# Patient Record
Sex: FEMALE | Race: WHITE | NOT HISPANIC OR LATINO
[De-identification: names, ages, dates, MRNs, and addresses within clinical notes are randomized per-mention and may not be internally consistent; named-entity substitution may affect disease eponyms.]

---

## 2018-01-28 ENCOUNTER — TRANSCRIPTION ENCOUNTER (OUTPATIENT)
Age: 73
End: 2018-01-28

## 2021-05-09 ENCOUNTER — EMERGENCY (EMERGENCY)
Facility: HOSPITAL | Age: 76
LOS: 1 days | Discharge: HOME | End: 2021-05-09
Attending: EMERGENCY MEDICINE | Admitting: EMERGENCY MEDICINE
Payer: MEDICARE

## 2021-05-09 VITALS
OXYGEN SATURATION: 96 % | WEIGHT: 171.08 LBS | TEMPERATURE: 97 F | RESPIRATION RATE: 18 BRPM | DIASTOLIC BLOOD PRESSURE: 100 MMHG | HEIGHT: 65 IN | SYSTOLIC BLOOD PRESSURE: 166 MMHG | HEART RATE: 81 BPM

## 2021-05-09 VITALS
HEART RATE: 72 BPM | SYSTOLIC BLOOD PRESSURE: 149 MMHG | TEMPERATURE: 98 F | DIASTOLIC BLOOD PRESSURE: 84 MMHG | OXYGEN SATURATION: 98 % | RESPIRATION RATE: 16 BRPM

## 2021-05-09 PROCEDURE — 99284 EMERGENCY DEPT VISIT MOD MDM: CPT | Mod: 25

## 2021-05-09 PROCEDURE — 70450 CT HEAD/BRAIN W/O DYE: CPT | Mod: 26,ME

## 2021-05-09 PROCEDURE — 70480 CT ORBIT/EAR/FOSSA W/O DYE: CPT

## 2021-05-09 PROCEDURE — 99284 EMERGENCY DEPT VISIT MOD MDM: CPT

## 2021-05-09 PROCEDURE — G1004: CPT

## 2021-05-09 PROCEDURE — 70450 CT HEAD/BRAIN W/O DYE: CPT

## 2021-05-09 PROCEDURE — 70480 CT ORBIT/EAR/FOSSA W/O DYE: CPT | Mod: 26,MG,59

## 2021-05-09 RX ORDER — ASPIRIN/CALCIUM CARB/MAGNESIUM 324 MG
1 TABLET ORAL
Qty: 0 | Refills: 0 | DISCHARGE

## 2021-05-09 RX ORDER — METFORMIN HYDROCHLORIDE 850 MG/1
1 TABLET ORAL
Qty: 0 | Refills: 0 | DISCHARGE

## 2021-05-09 RX ORDER — NIFEDIPINE 30 MG
1 TABLET, EXTENDED RELEASE 24 HR ORAL
Qty: 0 | Refills: 0 | DISCHARGE

## 2021-05-09 RX ORDER — TRANDOLAPRIL 2 MG
1 TABLET ORAL
Qty: 0 | Refills: 0 | DISCHARGE

## 2021-05-09 RX ORDER — ATENOLOL 25 MG/1
1 TABLET ORAL
Qty: 0 | Refills: 0 | DISCHARGE

## 2021-05-09 RX ORDER — PANTOPRAZOLE SODIUM 20 MG/1
1 TABLET, DELAYED RELEASE ORAL
Qty: 0 | Refills: 0 | DISCHARGE

## 2021-05-09 RX ORDER — ROSUVASTATIN CALCIUM 5 MG/1
1 TABLET ORAL
Qty: 0 | Refills: 0 | DISCHARGE

## 2021-05-09 RX ORDER — GUSELKUMAB 100 MG/ML
0 INJECTION SUBCUTANEOUS
Qty: 0 | Refills: 0 | DISCHARGE

## 2021-05-09 RX ORDER — LEVOTHYROXINE SODIUM 125 MCG
1 TABLET ORAL
Qty: 0 | Refills: 0 | DISCHARGE

## 2021-05-09 RX ORDER — DICLOFENAC SODIUM 75 MG/1
1 TABLET, DELAYED RELEASE ORAL
Qty: 0 | Refills: 0 | DISCHARGE

## 2021-05-09 NOTE — ED PROVIDER NOTE - PMH
Aortic insufficiency    Diabetes    GERD (gastroesophageal reflux disease)    HTN (hypertension)

## 2021-05-09 NOTE — ED PROVIDER NOTE - OBJECTIVE STATEMENT
74yo female who presents with head injury s/p fall. pt states she was in a restaurant coming down the stairs and lost her balance, fell and landed on a metal umbrella stand, no LOC< +head trauma, pt c/o pain and swelling over left eye, no blurry or double vision, no neck pain, no other complaints

## 2021-05-09 NOTE — ED ADULT NURSE REASSESSMENT NOTE - NS ED NURSE REASSESS COMMENT FT1
pt is A&Ox4,presented to the ER post fall, bruise noted on the left eye, headache+, denies any blurry vision, ambulates w/o any assistance, diagnostic tests performed, pt updated oon the plan of care, resp even and unlabored, nad noted, will continue to monitor

## 2021-05-09 NOTE — ED ADULT TRIAGE NOTE - CHIEF COMPLAINT QUOTE
Pt tripped and fell hit head/left eye on umbrella rack, left eye swollen shut, c/o headache, dizziness, on baby ASA daily

## 2021-05-09 NOTE — ED PROVIDER NOTE - CARE PLAN
Principal Discharge DX:	Facial contusion, initial encounter  Secondary Diagnosis:	Head injury due to trauma

## 2021-05-09 NOTE — ED PROVIDER NOTE - PATIENT PORTAL LINK FT
You can access the FollowMyHealth Patient Portal offered by [facility] by registering at the following website: [email or web address]. By joining [employer]’s FollowMyHealth portal, you will also be able to view your health information using other applications (apps) compatible with our system.

## 2021-05-09 NOTE — ED PROVIDER NOTE - NSFOLLOWUPINSTRUCTIONS_ED_ALL_ED_FT
Facial Contusion    WHAT YOU NEED TO KNOW:    A facial contusion is a bruise that appears on your face after an injury. A bruise happens when small blood vessels tear but skin does not. When blood vessels tear, blood leaks into nearby tissue, such as soft tissue or muscle. You may develop swelling and bruising around your eyes if your bruise is on your brow, forehead, or the bridge of your nose.     DISCHARGE INSTRUCTIONS:    Return to the emergency department if:   •You have a fever.      •You have watery, clear fluid draining from your nose.       •You have changes in your vision or eye appearance.      •You have changes or pain with eye movement.      •You have tingling or numbness in or near the injured area.      Contact your healthcare provider if:   •You find a new lump in the injured area.      •Your symptoms do not improve with treatment.      •You have questions or concerns about your condition or care.      Medicines:   •Acetaminophen decreases pain. It is available without a doctor's order. Ask how much to take and how often to take it. Follow directions. Acetaminophen can cause liver damage if not taken correctly.      •Take your medicine as directed. Contact your healthcare provider if you think your medicine is not helping or if you have side effects. Tell him of her if you are allergic to any medicine. Keep a list of the medicines, vitamins, and herbs you take. Include the amounts, and when and why you take them. Bring the list or the pill bottles to follow-up visits. Carry your medicine list with you in case of an emergency.      Ice: Apply ice on your bruise for 15 to 20 minutes every hour or as directed. Use an ice pack, or put crushed ice in a plastic bag. Cover it with a towel. Ice helps prevent tissue damage and decreases swelling and pain.    Elevation: Sleep with your head elevated to help decrease swelling.     Help your contusion heal: Do not massage the area or put heating pads or other warming devices on the bruise right after your injury. Heat and massage may slow the healing of the area.    Follow up with your healthcare provider as directed: You may need to return within a week to have your injury checked again. Write down any questions you have so you remember to ask them in your follow-up visits.    Prevent a facial contusion:   •Use safety belts and child restraints.       •Use safety helmets when you ride a bicycle or motorcycle.       •Use a mouth and face guard during sports.

## 2021-09-26 ENCOUNTER — NON-APPOINTMENT (OUTPATIENT)
Age: 76
End: 2021-09-26

## 2021-09-30 ENCOUNTER — NON-APPOINTMENT (OUTPATIENT)
Age: 76
End: 2021-09-30

## 2021-09-30 ENCOUNTER — APPOINTMENT (OUTPATIENT)
Dept: SURGERY | Facility: CLINIC | Age: 76
End: 2021-09-30
Payer: MEDICARE

## 2021-09-30 VITALS
HEART RATE: 62 BPM | WEIGHT: 175 LBS | HEIGHT: 65 IN | BODY MASS INDEX: 29.16 KG/M2 | DIASTOLIC BLOOD PRESSURE: 103 MMHG | SYSTOLIC BLOOD PRESSURE: 173 MMHG

## 2021-09-30 PROCEDURE — 99204 OFFICE O/P NEW MOD 45 MIN: CPT

## 2021-09-30 RX ORDER — TRIAMTERENE 50 MG/1
CAPSULE ORAL
Refills: 0 | Status: ACTIVE | COMMUNITY

## 2021-09-30 RX ORDER — LEVOTHYROXINE SODIUM 0.09 MG/1
88 TABLET ORAL
Refills: 0 | Status: ACTIVE | COMMUNITY

## 2021-09-30 RX ORDER — ATENOLOL 50 MG/1
TABLET ORAL
Refills: 0 | Status: ACTIVE | COMMUNITY

## 2021-10-02 NOTE — REASON FOR VISIT
[Initial Consultation] : an initial consultation for [FreeTextEntry2] : cervical lymphadenopathy [Other: _____] : [unfilled]

## 2021-10-02 NOTE — HISTORY OF PRESENT ILLNESS
[de-identified] : Pt c/o Right neck mass for 6 weeks . denies dental or scalp infections,night sweats,  cough or sore throat, fever, dysphagia, hoarseness, RT exposure or skin cancer excision.  history of thyroid lobectomy for benign disease 20 years ago\par sonogram : Right level 2 Lymph node  1.3 cm,  benign appearing\par WBC 7.4\par I have reviewed all old and new data and available images.\par

## 2021-10-02 NOTE — CONSULT LETTER
[Dear  ___] : Dear  [unfilled], [Consult Letter:] : I had the pleasure of evaluating your patient, [unfilled]. [Please see my note below.] : Please see my note below. [Consult Closing:] : Thank you very much for allowing me to participate in the care of this patient.  If you have any questions, please do not hesitate to contact me. [Sincerely,] : Sincerely, [FreeTextEntry2] : Dr. Bebeto Wiggins, Dr. Yfn Stinson, Dr. Francesca Smith, Dr. Terry Mcmahon, Dr. Anthony Rodriguez, Dr. Germán Marques [FreeTextEntry3] : Jagdish Delaney MD, FACS\par System Director, Endocrine Surgery\par [facility]\par Assistant Clinical Professor of Surgery\par [employer] [facility]  [DrSamia  ___] : Dr. CARTER [DrSamia ___] : Dr. CARTER [___] : [unfilled]

## 2021-10-02 NOTE — ASSESSMENT
[FreeTextEntry1] : possible carotidynia.  CT requested. to call next week for results. patient has been given the opportunity to ask questions, and all of the patient's questions have been answered to their satisfaction\par

## 2021-10-02 NOTE — PHYSICAL EXAM
[de-identified] : well healed low neck scar.  tender right carotid bulb with tortuosity of carotid [de-identified] : no palpable thyroid nodules [Laryngoscopy Performed] : laryngoscopy was performed, see procedure section for findings [Midline] : located in midline position [Normal] : orientation to person, place, and time: normal [de-identified] : indirect  laryngoscopy shows normal vocal cord mobility bilaterally with no lesions noted

## 2021-10-05 ENCOUNTER — NON-APPOINTMENT (OUTPATIENT)
Age: 76
End: 2021-10-05

## 2022-01-13 ENCOUNTER — APPOINTMENT (OUTPATIENT)
Dept: SURGERY | Facility: CLINIC | Age: 77
End: 2022-01-13

## 2022-05-03 ENCOUNTER — APPOINTMENT (OUTPATIENT)
Dept: SURGERY | Facility: CLINIC | Age: 77
End: 2022-05-03
Payer: MEDICARE

## 2022-05-03 DIAGNOSIS — R59.0 LOCALIZED ENLARGED LYMPH NODES: ICD-10-CM

## 2022-05-03 DIAGNOSIS — G90.01 CAROTID SINUS SYNCOPE: ICD-10-CM

## 2022-05-03 PROCEDURE — 99214 OFFICE O/P EST MOD 30 MIN: CPT

## 2022-05-03 NOTE — ASSESSMENT
[FreeTextEntry1] : will observe. to return earlier if any change. . patient has been given the opportunity to ask questions, and all of the patient's questions have been answered to their satisfaction\par

## 2022-05-03 NOTE — PHYSICAL EXAM
[de-identified] : well healed low neck scar.  tender right carotid bulb with tortuosity of carotid [de-identified] : no palpable thyroid nodules [Laryngoscopy Performed] : laryngoscopy was performed, see procedure section for findings [Midline] : located in midline position [Normal] : orientation to person, place, and time: normal

## 2022-05-03 NOTE — HISTORY OF PRESENT ILLNESS
[de-identified] : previous evaluation of neck nodes and right neck discomfort related to carotid. negative CT.  denies any symptoms. no changes medically since last visit. recent TFTs normal. bone density shows osteopenia. I have reviewed all old and new data and available images.\par

## 2022-09-03 ENCOUNTER — EMERGENCY (EMERGENCY)
Facility: HOSPITAL | Age: 77
LOS: 0 days | Discharge: HOME | End: 2022-09-03
Attending: EMERGENCY MEDICINE
Payer: MEDICARE

## 2022-09-03 VITALS
OXYGEN SATURATION: 98 % | WEIGHT: 175.05 LBS | HEART RATE: 61 BPM | DIASTOLIC BLOOD PRESSURE: 66 MMHG | HEIGHT: 65 IN | RESPIRATION RATE: 18 BRPM | TEMPERATURE: 97 F | SYSTOLIC BLOOD PRESSURE: 100 MMHG

## 2022-09-03 VITALS
DIASTOLIC BLOOD PRESSURE: 60 MMHG | HEART RATE: 66 BPM | SYSTOLIC BLOOD PRESSURE: 111 MMHG | RESPIRATION RATE: 17 BRPM | OXYGEN SATURATION: 99 % | TEMPERATURE: 98 F

## 2022-09-03 DIAGNOSIS — E11.9 TYPE 2 DIABETES MELLITUS WITHOUT COMPLICATIONS: ICD-10-CM

## 2022-09-03 DIAGNOSIS — I35.1 NONRHEUMATIC AORTIC (VALVE) INSUFFICIENCY: ICD-10-CM
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
## 2022-09-03 DIAGNOSIS — Z20.822 CONTACT WITH AND (SUSPECTED) EXPOSURE TO COVID-19: ICD-10-CM

## 2022-09-03 DIAGNOSIS — Z79.82 LONG TERM (CURRENT) USE OF ASPIRIN: ICD-10-CM

## 2022-09-03 DIAGNOSIS — T40.425A ADVERSE EFFECT OF TRAMADOL, INITIAL ENCOUNTER: ICD-10-CM

## 2022-09-03 DIAGNOSIS — Z79.84 LONG TERM (CURRENT) USE OF ORAL HYPOGLYCEMIC DRUGS: ICD-10-CM

## 2022-09-03 DIAGNOSIS — R44.3 HALLUCINATIONS, UNSPECIFIED: ICD-10-CM

## 2022-09-03 DIAGNOSIS — R00.1 BRADYCARDIA, UNSPECIFIED: ICD-10-CM

## 2022-09-03 DIAGNOSIS — I10 ESSENTIAL (PRIMARY) HYPERTENSION: ICD-10-CM

## 2022-09-03 DIAGNOSIS — Z96.651 PRESENCE OF RIGHT ARTIFICIAL KNEE JOINT: ICD-10-CM
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
## 2022-09-03 DIAGNOSIS — K21.9 GASTRO-ESOPHAGEAL REFLUX DISEASE WITHOUT ESOPHAGITIS: ICD-10-CM

## 2022-09-03 LAB
ALBUMIN SERPL ELPH-MCNC: 3.1 G/DL — LOW (ref 3.3–5)
ALP SERPL-CCNC: 82 U/L — SIGNIFICANT CHANGE UP (ref 40–120)
ALT FLD-CCNC: 37 U/L — SIGNIFICANT CHANGE UP (ref 12–78)
ANION GAP SERPL CALC-SCNC: 7 MMOL/L — SIGNIFICANT CHANGE UP (ref 5–17)
APTT BLD: 26.6 SEC — LOW (ref 27.5–35.5)
AST SERPL-CCNC: 34 U/L — SIGNIFICANT CHANGE UP (ref 15–37)
BASOPHILS # BLD AUTO: 0.04 K/UL — SIGNIFICANT CHANGE UP (ref 0–0.2)
BASOPHILS NFR BLD AUTO: 0.5 % — SIGNIFICANT CHANGE UP (ref 0–2)
BILIRUB SERPL-MCNC: 0.2 MG/DL — SIGNIFICANT CHANGE UP (ref 0.2–1.2)
BUN SERPL-MCNC: 18 MG/DL — SIGNIFICANT CHANGE UP (ref 7–23)
CALCIUM SERPL-MCNC: 8.8 MG/DL — SIGNIFICANT CHANGE UP (ref 8.5–10.1)
CHLORIDE SERPL-SCNC: 101 MMOL/L — SIGNIFICANT CHANGE UP (ref 96–108)
CO2 SERPL-SCNC: 29 MMOL/L — SIGNIFICANT CHANGE UP (ref 22–31)
CREAT SERPL-MCNC: 0.83 MG/DL — SIGNIFICANT CHANGE UP (ref 0.5–1.3)
D DIMER BLD IA.RAPID-MCNC: <150 NG/ML DDU — SIGNIFICANT CHANGE UP
EGFR: 73 ML/MIN/1.73M2 — SIGNIFICANT CHANGE UP
EOSINOPHIL # BLD AUTO: 0.14 K/UL — SIGNIFICANT CHANGE UP (ref 0–0.5)
EOSINOPHIL NFR BLD AUTO: 1.7 % — SIGNIFICANT CHANGE UP (ref 0–6)
FLUAV AG NPH QL: SIGNIFICANT CHANGE UP
FLUBV AG NPH QL: SIGNIFICANT CHANGE UP
GLUCOSE SERPL-MCNC: 119 MG/DL — HIGH (ref 70–99)
HCT VFR BLD CALC: 26.5 % — LOW (ref 34.5–45)
HGB BLD-MCNC: 9 G/DL — LOW (ref 11.5–15.5)
IMM GRANULOCYTES NFR BLD AUTO: 0.4 % — SIGNIFICANT CHANGE UP (ref 0–1.5)
INR BLD: 1.06 RATIO — SIGNIFICANT CHANGE UP (ref 0.88–1.16)
LYMPHOCYTES # BLD AUTO: 1.11 K/UL — SIGNIFICANT CHANGE UP (ref 1–3.3)
LYMPHOCYTES # BLD AUTO: 13.8 % — SIGNIFICANT CHANGE UP (ref 13–44)
MCHC RBC-ENTMCNC: 29.3 PG — SIGNIFICANT CHANGE UP (ref 27–34)
MCHC RBC-ENTMCNC: 34 GM/DL — SIGNIFICANT CHANGE UP (ref 32–36)
MCV RBC AUTO: 86.3 FL — SIGNIFICANT CHANGE UP (ref 80–100)
MONOCYTES # BLD AUTO: 0.86 K/UL — SIGNIFICANT CHANGE UP (ref 0–0.9)
MONOCYTES NFR BLD AUTO: 10.7 % — SIGNIFICANT CHANGE UP (ref 2–14)
NEUTROPHILS # BLD AUTO: 5.86 K/UL — SIGNIFICANT CHANGE UP (ref 1.8–7.4)
NEUTROPHILS NFR BLD AUTO: 72.9 % — SIGNIFICANT CHANGE UP (ref 43–77)
PLATELET # BLD AUTO: 211 K/UL — SIGNIFICANT CHANGE UP (ref 150–400)
POTASSIUM SERPL-MCNC: 4.3 MMOL/L — SIGNIFICANT CHANGE UP (ref 3.5–5.3)
POTASSIUM SERPL-SCNC: 4.3 MMOL/L — SIGNIFICANT CHANGE UP (ref 3.5–5.3)
PROT SERPL-MCNC: 6.1 GM/DL — SIGNIFICANT CHANGE UP (ref 6–8.3)
PROTHROM AB SERPL-ACNC: 12.3 SEC — SIGNIFICANT CHANGE UP (ref 10.5–13.4)
RBC # BLD: 3.07 M/UL — LOW (ref 3.8–5.2)
RBC # FLD: 13.5 % — SIGNIFICANT CHANGE UP (ref 10.3–14.5)
RSV RNA NPH QL NAA+NON-PROBE: SIGNIFICANT CHANGE UP
SARS-COV-2 RNA SPEC QL NAA+PROBE: SIGNIFICANT CHANGE UP
SODIUM SERPL-SCNC: 137 MMOL/L — SIGNIFICANT CHANGE UP (ref 135–145)
TROPONIN I, HIGH SENSITIVITY RESULT: 4.5 NG/L — SIGNIFICANT CHANGE UP
WBC # BLD: 8.04 K/UL — SIGNIFICANT CHANGE UP (ref 3.8–10.5)
WBC # FLD AUTO: 8.04 K/UL — SIGNIFICANT CHANGE UP (ref 3.8–10.5)

## 2022-09-03 PROCEDURE — 80053 COMPREHEN METABOLIC PANEL: CPT

## 2022-09-03 PROCEDURE — 93010 ELECTROCARDIOGRAM REPORT: CPT

## 2022-09-03 PROCEDURE — 82962 GLUCOSE BLOOD TEST: CPT

## 2022-09-03 PROCEDURE — 85379 FIBRIN DEGRADATION QUANT: CPT

## 2022-09-03 PROCEDURE — 84484 ASSAY OF TROPONIN QUANT: CPT

## 2022-09-03 PROCEDURE — 99285 EMERGENCY DEPT VISIT HI MDM: CPT | Mod: 25

## 2022-09-03 PROCEDURE — 36415 COLL VENOUS BLD VENIPUNCTURE: CPT

## 2022-09-03 PROCEDURE — 85610 PROTHROMBIN TIME: CPT

## 2022-09-03 PROCEDURE — 70450 CT HEAD/BRAIN W/O DYE: CPT | Mod: MA

## 2022-09-03 PROCEDURE — 85025 COMPLETE CBC W/AUTO DIFF WBC: CPT

## 2022-09-03 PROCEDURE — 99285 EMERGENCY DEPT VISIT HI MDM: CPT

## 2022-09-03 PROCEDURE — 0241U: CPT

## 2022-09-03 PROCEDURE — 70450 CT HEAD/BRAIN W/O DYE: CPT | Mod: 26,MA

## 2022-09-03 PROCEDURE — 93005 ELECTROCARDIOGRAM TRACING: CPT

## 2022-09-03 PROCEDURE — 85730 THROMBOPLASTIN TIME PARTIAL: CPT

## 2022-09-03 NOTE — ED ADULT NURSE NOTE - OBJECTIVE STATEMENT
Pt BIBEMS from home, pt was found on the floor by physical therapist. Unknown downtime, unknown LOC, or head strike. On aspirin daily. Just had R knee revision done at [facility] 2 days ago. . Pt has no complaints at this time.

## 2022-09-03 NOTE — ED PROVIDER NOTE - PROGRESS NOTE DETAILS
Pt refused the CXR. MD NICOLE [phone], S surgeon. MD NICOLE D/W [facility] NP Nathalie Bettsarland, agrees with plan. Pt with HgB at [facility] similar, 9.7  She will address further pain management options with the family. Advised to stop tramadol. Discussed all results with  and pt. Agreeable with dc home. Understands reason for UA and CXR (r/o infection), still refuses. Return precautions given.  MD NICOLE

## 2022-09-03 NOTE — ED PROVIDER NOTE - NSFOLLOWUPINSTRUCTIONS_ED_ALL_ED_FT
Stop the tramadol.    Continue other usual medicines.      Follow up with [facility].     Return to ER for any concerns.

## 2022-09-03 NOTE — ED PROVIDER NOTE - NS ED ROS FT
Constitutional: No fever or chills  Eyes: No visual changes  HEENT: No throat pain  CV: No chest pain  Resp: No SOB no cough  GI: No abd pain, nausea or vomiting  : No dysuria  MSK: No musculoskeletal pain  Skin: No rash  Neuro: No headache, +hallucinations

## 2022-09-03 NOTE — ED ADULT NURSE NOTE - NSIMPLEMENTINTERV_GEN_ALL_ED
Implemented All Fall with Harm Risk Interventions:  [place] to call system. Call bell, personal items and telephone within reach. Instruct patient to call for assistance. Room bathroom lighting operational. Non-slip footwear when patient is off stretcher. Physically safe environment: no spills, clutter or unnecessary equipment. Stretcher in lowest position, wheels locked, appropriate side rails in place. Provide visual cue, wrist band, yellow gown, etc. Monitor gait and stability. Monitor for mental status changes and reorient to person, place, and time. Review medications for side effects contributing to fall risk. Reinforce activity limits and safety measures with patient and family. Provide visual clues: red socks.

## 2022-09-03 NOTE — ED PROVIDER NOTE - PHYSICAL EXAMINATION
Constitutional: NAD AOx3  Eyes: PERRL EOMI  Head: Normocephalic atraumatic  Mouth: MMM  Cardiac: regular rate and rhythm  Resp: Lungs CTAB  GI: Abd s/nd/nt  Neuro: CN2-12 grossly intact, SALEH x 4  Skin: No visible rashes, R knee bandage dry clean intact, surrounding yellow green contusion Constitutional: NAD AOx3  Eyes: PERRL EOMI  Head: Normocephalic atraumatic  Mouth: MMM  Cardiac: regular rate and rhythm  Resp: Lungs CTAB  GI: Abd s/nd/nt  Neuro: CN2-12 grossly intact, SALEH x 4  Skin: No visible rashes, R knee bandage CDI, some surrounding yellow green contusion, trave edema. no erythema , no warmth, somewhat limited ROM secondary to pain, can flex to approx 45 degrees

## 2022-09-03 NOTE — ED PROVIDER NOTE - OBJECTIVE STATEMENT
77 y/o female w/ PMHx w/ PMH, aortic insufficiency, GERD, DM, HTN. Pt presents to ED BIBEMS from home, pt was found on the floor by physical therapist. LOC On baby aspirin daily twice a day, was once a day before surgery. Just [facility]. Fell last night at 5:30, was hallucinating yesterday 9/2, has hx of opioids. Takes Tramadol dose this morning 6 AM and noon. No pain from fall. A little disconnected according to . No fever. 77 y/o female w/ PMHx w/ PMH, aortic insufficiency, GERD, DM, HTN. Pt presents to ED BIBEMS from home, pt was found on the floor by physical therapist. No LOC. On baby aspirin twice a day, was once a day before surgery. Just had R knee revision done at [facility] 2 days ago. Fell last night at 5:30, was hallucinating yesterday 9/2, has hx of reaction to opioids so was given tramadol post op for pain. Takes Tramadol dose this morning 6 AM and noon. No pain from fall. A little disconnected according to . No fever.  believes this is a reaction to the tramadol. No HA, No neck pain, ate normally today. Knee feels at baseline. No hip pain. No cp. No SOB.  states she is behaving more normally now. Pt AOx3 during HPI.

## 2022-09-03 NOTE — ED ADULT TRIAGE NOTE - CHIEF COMPLAINT QUOTE
Pt BIBEMS from home, pt was found on the floor by physical therapist. Unknown downtime, unknown LOC, or head strike. On aspirin daily. Just had R knee revision done at [facility] 2 days ago. . Pt has no complaints at this time. MD Huerta made aware. Neuro alert called. Brought directly to CT scan.

## 2022-09-03 NOTE — ED PROVIDER NOTE - PATIENT PORTAL LINK FT
You can access the FollowMyHealth Patient Portal offered by [facility] by registering at the following website: [email or web address]. By joining [employer]’s FollowMyHealth portal, you will also be able to view your health information using other applications (apps) compatible with our system.

## 2022-09-03 NOTE — ED PROVIDER NOTE - NSICDXPASTMEDICALHX_GEN_ALL_CORE_FT
PAST MEDICAL HISTORY:  Aortic insufficiency     Diabetes     GERD (gastroesophageal reflux disease)     HTN (hypertension)

## 2022-09-03 NOTE — ED ADULT NURSE NOTE - CHIEF COMPLAINT QUOTE
Pt BIBEMS from home, pt was found on the floor by physical therapist. Unknown downtime, unknown LOC, or head strike. On aspirin daily. Just had R knee revision done at [facility] 2 days ago. . Pt has no complaints at this time. MD Huerta made aware. Neuro alert called. Brought directly to CT scan.

## 2022-09-09 ENCOUNTER — EMERGENCY (EMERGENCY)
Facility: HOSPITAL | Age: 77
LOS: 0 days | Discharge: HOME | End: 2022-09-09
Attending: STUDENT IN AN ORGANIZED HEALTH CARE EDUCATION/TRAINING PROGRAM
Payer: MEDICARE

## 2022-09-09 VITALS
RESPIRATION RATE: 18 BRPM | SYSTOLIC BLOOD PRESSURE: 113 MMHG | OXYGEN SATURATION: 100 % | TEMPERATURE: 97 F | DIASTOLIC BLOOD PRESSURE: 63 MMHG | HEART RATE: 66 BPM

## 2022-09-09 VITALS — WEIGHT: 160.06 LBS | HEIGHT: 65 IN

## 2022-09-09 DIAGNOSIS — I10 ESSENTIAL (PRIMARY) HYPERTENSION: ICD-10-CM

## 2022-09-09 DIAGNOSIS — Z79.82 LONG TERM (CURRENT) USE OF ASPIRIN: ICD-10-CM

## 2022-09-09 DIAGNOSIS — S01.511A LACERATION WITHOUT FOREIGN BODY OF LIP, INITIAL ENCOUNTER: ICD-10-CM

## 2022-09-09 DIAGNOSIS — E11.9 TYPE 2 DIABETES MELLITUS WITHOUT COMPLICATIONS: ICD-10-CM

## 2022-09-09 DIAGNOSIS — K21.9 GASTRO-ESOPHAGEAL REFLUX DISEASE WITHOUT ESOPHAGITIS: ICD-10-CM
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
## 2022-09-09 DIAGNOSIS — Z23 ENCOUNTER FOR IMMUNIZATION: ICD-10-CM

## 2022-09-09 DIAGNOSIS — Z79.84 LONG TERM (CURRENT) USE OF ORAL HYPOGLYCEMIC DRUGS: ICD-10-CM

## 2022-09-09 DIAGNOSIS — I35.1 NONRHEUMATIC AORTIC (VALVE) INSUFFICIENCY: ICD-10-CM

## 2022-09-09 PROCEDURE — 99283 EMERGENCY DEPT VISIT LOW MDM: CPT | Mod: GC

## 2022-09-09 PROCEDURE — 99283 EMERGENCY DEPT VISIT LOW MDM: CPT | Mod: 25

## 2022-09-09 PROCEDURE — 90471 IMMUNIZATION ADMIN: CPT

## 2022-09-09 PROCEDURE — 90715 TDAP VACCINE 7 YRS/> IM: CPT

## 2022-09-09 RX ORDER — TETANUS TOXOID, REDUCED DIPHTHERIA TOXOID AND ACELLULAR PERTUSSIS VACCINE, ADSORBED 5; 2.5; 8; 8; 2.5 [IU]/.5ML; [IU]/.5ML; UG/.5ML; UG/.5ML; UG/.5ML
0.5 SUSPENSION INTRAMUSCULAR ONCE
Refills: 0 | Status: COMPLETED | OUTPATIENT
Start: 2022-09-09 | End: 2022-09-09

## 2022-09-09 RX ADMIN — TETANUS TOXOID, REDUCED DIPHTHERIA TOXOID AND ACELLULAR PERTUSSIS VACCINE, ADSORBED 0.5 MILLILITER(S): 5; 2.5; 8; 8; 2.5 SUSPENSION INTRAMUSCULAR at 17:01

## 2022-09-09 NOTE — ED STATDOCS - PATIENT PORTAL LINK FT
You can access the FollowMyHealth Patient Portal offered by [facility] by registering at the following website: [email or web address]. By joining [employer]’s FollowMyHealth portal, you will also be able to view your health information using other applications (apps) compatible with our system.

## 2022-09-09 NOTE — ED STATDOCS - PHYSICAL EXAMINATION
GENERAL: Awake, alert, NAD  HEENT: NC/AT, moist mucous membranes, PERRL, EOMI. Upper lip with 2cm gaping v-shaped laceration involving the vermillion border. Bleeding well controlled. No blood in oropharynx, no dental injuries.   LUNGS: CTAB, no wheezes or crackles   CARDIAC: RRR, no m/r/g  ABDOMEN: Soft, normal BS, non tender, non distended, no rebound, no guarding  BACK: No midline spinal tenderness, no CVA tenderness  EXT: No edema, no calf tenderness, 2+ DP pulses bilaterally, no deformities. R knee in immobilizer.   NEURO: A&Ox3. Moving all extremities.  SKIN: Warm and dry. No rash.  PSYCH: Normal affect.

## 2022-09-09 NOTE — ED STATDOCS - CARE PROVIDER_API CALL
Aide Paniagua)  Plastic Surgery  [address], [address]  [place] [address]  Phone: [phone]  Fax: [phone]  Follow Up Time: 7-10 Days

## 2022-09-09 NOTE — ED STATDOCS - ATTENDING CONTRIBUTION TO CARE
I, Ruslan Carrasco MD, personally saw the patient with ACP.  I have personally performed a face-to-face diagnostic evaluation on this patient. I have reviewed the ACP note and agree with the history, exam, and plan of care, except as noted. I personally saw the patient and performed a substantive portion of the visit including all aspects of the medical decision making. I, Ruslan Carrasco MD, personally saw the patient with resident. I have personally performed a face-to-face diagnostic evaluation on this patient. I have reviewed the resident note and agree with the history, exam, and plan of care, except as noted. I personally saw the patient and performed a substantive portion of the visit including all aspects of the medical decision making.

## 2022-09-09 NOTE — ED ADULT NURSE NOTE - CAS DISCH TRANSFER METHOD
[Lower Ext Edema] : lower extremity edema [Negative] : Genitourinary [Chest Pain] : no chest pain Private car [Palpitations] : no palpitations [Claudication] : no  leg claudication [Orthopena] : no orthopnea [Paroxysmal Nocturnal Dyspnea] : no paroxysmal nocturnal dyspnea

## 2022-09-09 NOTE — ED ADULT NURSE NOTE - NS_ED_NURSE_TEACHING_TOPIC_ED_A_ED
Pt educated on all d/c instructions at this time to myself at this time of d/c home, pt observed ambulatory with steady gait in no distress.

## 2022-09-09 NOTE — ED STATDOCS - CLINICAL SUMMARY MEDICAL DECISION MAKING FREE TEXT BOX
76 year old F with PMH PMH, aortic insufficiency, GERD, DM, HTN, s/p R knee revision done at [facility] on 9/1 presenting with an upper lip laceration. No head trauma or LOC. Takes baby aspirin daily. Upper right lip with 2cm v-shaped laceration, gaping, through vermilion border. Bleeding well controlled. Will require repair by plastic surgery.

## 2022-09-09 NOTE — ED STATDOCS - PROGRESS NOTE DETAILS
Sherron Lange PGY-3: Spoke with Dr. Paniagua from plastics. To see patient in 2-3  hours. Sherron Lange PGY-3: Plastics at bedside. Sherron Lange PGY-3: Plastics repaired lac. To f/u with Dr. Paniagua. Patient and  understand wound care and f/u instructions.

## 2022-09-09 NOTE — ED STATDOCS - OBJECTIVE STATEMENT
76 year old F with PMH PMH, aortic insufficiency, GERD, DM, HTN, s/p R knee revision done at [facility] on 9/1 presenting with an upper lip laceration. Patient has been using walker since her surgery. She had a misstep, and fell forward, hitting her face on the top portion of the walker. Takes 2 baby aspirin daily for postop prophylaxis. Stated it bled a lot, but bleeding well controlled here. Denies head trauma, vision changes, vomiting, dental pain.

## 2022-09-09 NOTE — ED STATDOCS - NSFOLLOWUPINSTRUCTIONS_ED_ALL_ED_FT
Mouth Laceration      A mouth laceration is a deep cut inside your mouth. The cut may go into your lip or go all the way through your mouth and cheek. The cut may also affect your tongue, the insides of your cheeks, or the upper surface of your mouth (palate). Mouth lacerations may bleed a lot.      What are the causes?    This injury often happens when your teeth cut the lining of your mouth. It may also result from an injury to your face.      What are the signs or symptoms?    •Bleeding. This is the most common symptom.      •Pain.      •Feeling a hole or tear in your mouth.        How is this treated?    •Small cuts in the mouth may not need treatment if bleeding has stopped.      •Stitches (sutures) may be needed for cuts that are large or deep.      •Stitches may be needed for cuts that keep bleeding.      •You may also receive antibiotic medicine or a tetanus shot.        Follow these instructions at home:    Medicines     •Take over-the-counter and prescription medicines only as told by your doctor.      •If you were given an antibiotic, take or apply it as told by your doctor. Do not stop using the antibiotic even if you start to feel better.      •Ask your doctor if you should avoid driving or using machines while you are taking your medicine.        Eating and drinking   A diet of soft foods, including applesauce, yogurt, ice cream, and a smoothie.   •Eat a soft diet.      •Avoid hot foods and hot liquids for a few days as told by your doctor.      •Rinse your mouth after eating.      Wound care     •It is normal to have a white or gray patch over your wound while it heals.    •Check your wound every day for signs of infection. Check for:  •Redness, swelling, or pain.      •Fluid or blood.      •Warmth.      •Pus or a bad smell.        •Gently gargle and rinse your mouth 4 to 6 times a day. Spit out the liquid. Do not swallow.    •Use the rinse solution as told by your doctor. The most used types of rinse include:  •A rinse that kills bacteria (chlorhexidine).      •Saline rinse.        • Do not poke the stitches with your tongue. Doing that can loosen them.    •If you have a cut on your lip:  •Keep the wound fully dry for the first 24 hours, or as told by your doctor. After that time, you may take a shower or a bath. Do not soak in water until after the stitches have been taken out.      •If you were given a bandage, change it at least once a day, or as told by your doctor. You should also change it if it gets wet or dirty.      •Clean the wound once a day, or as told by your doctor.    •After you clean the wound, put a thin layer of antibiotic ointment on it as told by your doctor. This ointment:  •Helps to prevent infection.      •Keeps the bandage from sticking to the wound.            General instructions   Using a toothbrush to brush the front and back sides of the teeth.   •Keep your mouth and teeth clean. Gently brush your teeth with a soft toothbrush 2 times a day.      • Do not smoke or use any products that contain nicotine or tobacco. If you need help quitting, ask your doctor.      •Keep all follow-up visits.        Contact a doctor if:    •Medicine does not help your pain.      •You have a fever or chills.      •You have redness, swelling, or pain on your wound that is getting worse.      •You have fresh bleeding or pus coming from your wound.      •You have swollen or tender glands in your throat.        Get help right away if:    •The edges of your wound break open.      •Your face or the area under your jaw gets swollen.      •You have trouble breathing or swallowing.      These symptoms may be an emergency. Get help right away. Call 911.   • Do not wait to see if the symptoms will go away.       • Do not drive yourself to the hospital.         Summary    •A mouth laceration is a deep cut inside your mouth.      •Mouth lacerations may bleed a lot and may need to be treated with stitches.      •Check your wound every day for signs of infection.      •Contact a doctor if you have fresh bleeding or you notice that pus is coming out of your wound.      This information is not intended to replace advice given to you by your health care provider. Make sure you discuss any questions you have with your health care provider. Please follow up with Dr. Paniagua in her office. Follow the wound care instructions provided to you.     Please return to the emergency department with any new or worsening symptoms, including but not limited to:  -Increased pain  -High fevers  -Blood or pus drainage  -Vomiting      Mouth Laceration      A mouth laceration is a deep cut inside your mouth. The cut may go into your lip or go all the way through your mouth and cheek. The cut may also affect your tongue, the insides of your cheeks, or the upper surface of your mouth (palate). Mouth lacerations may bleed a lot.      What are the causes?    This injury often happens when your teeth cut the lining of your mouth. It may also result from an injury to your face.      What are the signs or symptoms?    •Bleeding. This is the most common symptom.  •Pain.  •Feeling a hole or tear in your mouth.    How is this treated?  •Small cuts in the mouth may not need treatment if bleeding has stopped.  •Stitches (sutures) may be needed for cuts that are large or deep  •Stitches may be needed for cuts that keep bleeding.  •You may also receive antibiotic medicine or a tetanus shot.    Follow these instructions at home:    Medicines     •Take over-the-counter and prescription medicines only as told by your doctor.  •If you were given an antibiotic, take or apply it as told by your doctor. Do not stop using the antibiotic even if you start to feel better.  •Ask your doctor if you should avoid driving or using machines while you are taking your medicine.    Eating and drinking   A diet of soft foods, including applesauce, yogurt, ice cream, and a smoothie.   •Eat a soft diet.  •Avoid hot foods and hot liquids for a few days as told by your doctor.  •Rinse your mouth after eating.    Wound care   •It is normal to have a white or gray patch over your wound while it heals.  •Check your wound every day for signs of infection. Check for:  •Redness, swelling, or pain.  •Fluid or blood.  •Warmth.  •Pus or a bad smell.  •Gently gargle and rinse your mouth 4 to 6 times a day. Spit out the liquid. Do not swallow.    •Use the rinse solution as told by your doctor. The most used types of rinse include:  •A rinse that kills bacteria (chlorhexidine).  •Saline rinse.  • Do not poke the stitches with your tongue. Doing that can loosen them.    •If you have a cut on your lip:  •Keep the wound fully dry for the first 24 hours, or as told by your doctor. After that time, you may take a shower or a bath. Do not soak in water until after the stitches have been taken out.    •If you were given a bandage, change it at least once a day, or as told by your doctor. You should also change it if it gets wet or dirty.    •Clean the wound once a day, or as told by your doctor.    •After you clean the wound, put a thin layer of antibiotic ointment on it as told by your doctor. This ointment:  •Helps to prevent infection.  •Keeps the bandage from sticking to the wound.    General instructions   Using a toothbrush to brush the front and back sides of the teeth.   •Keep your mouth and teeth clean. Gently brush your teeth with a soft toothbrush 2 times a day.  • Do not smoke or use any products that contain nicotine or tobacco. If you need help quitting, ask your doctor.  •Keep all follow-up visits.    Contact a doctor if:    •Medicine does not help your pain.  •You have a fever or chills.  •You have redness, swelling, or pain on your wound that is getting worse.  •You have fresh bleeding or pus coming from your wound.  •You have swollen or tender glands in your throat.    Get help right away if:    •The edges of your wound break open.  •Your face or the area under your jaw gets swollen.  •You have trouble breathing or swallowing.    These symptoms may be an emergency. Get help right away. Call 911.   • Do not wait to see if the symptoms will go away.   • Do not drive yourself to the hospital.     Summary    •A mouth laceration is a deep cut inside your mouth.  •Mouth lacerations may bleed a lot and may need to be treated with stitches.  •Check your wound every day for signs of infection.  •Contact a doctor if you have fresh bleeding or you notice that pus is coming out of your wound.      This information is not intended to replace advice given to you by your health care provider. Make sure you discuss any questions you have with your health care provider.

## 2022-09-09 NOTE — ED ADULT TRIAGE NOTE - CHIEF COMPLAINT QUOTE
Pt presents to the ED c/o right-sided lip laceration. Pt states she just underwent knee surgery and was walking with her walker when she tripped and hit her mouth on the walker. Bleeding controlled. Pt denies LOC. Pt takes 2 baby ASA.

## 2022-11-03 ENCOUNTER — APPOINTMENT (OUTPATIENT)
Dept: SURGERY | Facility: CLINIC | Age: 77
End: 2022-11-03

## 2023-01-29 ENCOUNTER — FORM ENCOUNTER (OUTPATIENT)
Age: 78
End: 2023-01-29

## 2023-06-26 ENCOUNTER — TRANSCRIPTION ENCOUNTER (OUTPATIENT)
Age: 78
End: 2023-06-26

## 2023-06-26 ENCOUNTER — APPOINTMENT (OUTPATIENT)
Dept: NEUROLOGY | Facility: CLINIC | Age: 78
End: 2023-06-26

## 2023-06-26 VITALS
SYSTOLIC BLOOD PRESSURE: 147 MMHG | BODY MASS INDEX: 29.16 KG/M2 | DIASTOLIC BLOOD PRESSURE: 87 MMHG | WEIGHT: 175 LBS | HEIGHT: 65 IN | HEART RATE: 76 BPM

## 2023-12-08 ENCOUNTER — NON-APPOINTMENT (OUTPATIENT)
Age: 78
End: 2023-12-08

## 2024-11-05 ENCOUNTER — APPOINTMENT (OUTPATIENT)
Dept: ORTHOPEDIC SURGERY | Facility: CLINIC | Age: 79
End: 2024-11-05
Payer: MEDICARE

## 2024-11-05 VITALS — HEIGHT: 65 IN | WEIGHT: 165 LBS | BODY MASS INDEX: 27.49 KG/M2

## 2024-11-05 DIAGNOSIS — Z96.651 PAIN DUE TO INTERNAL ORTHOPEDIC PROSTHETIC DEVICES, IMPLANTS AND GRAFTS, INITIAL ENCOUNTER: ICD-10-CM

## 2024-11-05 DIAGNOSIS — Z96.651 PRESENCE OF RIGHT ARTIFICIAL KNEE JOINT: ICD-10-CM

## 2024-11-05 DIAGNOSIS — T84.84XA PAIN DUE TO INTERNAL ORTHOPEDIC PROSTHETIC DEVICES, IMPLANTS AND GRAFTS, INITIAL ENCOUNTER: ICD-10-CM

## 2024-11-05 PROCEDURE — 73562 X-RAY EXAM OF KNEE 3: CPT | Mod: RT

## 2024-11-05 PROCEDURE — 99204 OFFICE O/P NEW MOD 45 MIN: CPT

## 2024-11-05 RX ORDER — TIRZEPATIDE 5 MG/.5ML
5 INJECTION, SOLUTION SUBCUTANEOUS
Refills: 0 | Status: ACTIVE | COMMUNITY

## 2024-11-05 RX ORDER — AMLODIPINE BESYLATE 2.5 MG/1
2.5 TABLET ORAL
Refills: 0 | Status: ACTIVE | COMMUNITY

## 2024-11-05 RX ORDER — ALPRAZOLAM 0.5 MG/1
0.5 TABLET ORAL
Refills: 0 | Status: ACTIVE | COMMUNITY

## 2024-11-05 RX ORDER — NALTREXONE HYDROCHLORIDE 50 MG/1
TABLET, FILM COATED ORAL
Refills: 0 | Status: ACTIVE | COMMUNITY

## 2024-11-06 ENCOUNTER — APPOINTMENT (OUTPATIENT)
Dept: CT IMAGING | Facility: CLINIC | Age: 79
End: 2024-11-06
Payer: MEDICARE

## 2024-11-06 ENCOUNTER — OUTPATIENT (OUTPATIENT)
Dept: OUTPATIENT SERVICES | Facility: HOSPITAL | Age: 79
LOS: 1 days | End: 2024-11-06
Payer: MEDICARE

## 2024-11-06 ENCOUNTER — RESULT REVIEW (OUTPATIENT)
Age: 79
End: 2024-11-06

## 2024-11-06 PROCEDURE — 76376 3D RENDER W/INTRP POSTPROCES: CPT | Mod: 26

## 2024-11-06 PROCEDURE — 73700 CT LOWER EXTREMITY W/O DYE: CPT | Mod: 26,RT,MH

## 2024-11-10 LAB
BASOPHILS # BLD AUTO: 0.07 K/UL
BASOPHILS NFR BLD AUTO: 0.9 %
CRP SERPL-MCNC: <3 MG/L
EOSINOPHIL # BLD AUTO: 0.16 K/UL
EOSINOPHIL NFR BLD AUTO: 2.1 %
ERYTHROCYTE [SEDIMENTATION RATE] IN BLOOD BY WESTERGREN METHOD: 16 MM/HR
HCT VFR BLD CALC: 37 %
HGB BLD-MCNC: 10.9 G/DL
IMM GRANULOCYTES NFR BLD AUTO: 0.4 %
LYMPHOCYTES # BLD AUTO: 1.66 K/UL
LYMPHOCYTES NFR BLD AUTO: 22.2 %
MAN DIFF?: NORMAL
MCHC RBC-ENTMCNC: 22.8 PG
MCHC RBC-ENTMCNC: 29.5 G/DL
MCV RBC AUTO: 77.4 FL
MONOCYTES # BLD AUTO: 0.53 K/UL
MONOCYTES NFR BLD AUTO: 7.1 %
NEUTROPHILS # BLD AUTO: 5.04 K/UL
NEUTROPHILS NFR BLD AUTO: 67.3 %
PLATELET # BLD AUTO: 321 K/UL
RBC # BLD: 4.78 M/UL
RBC # FLD: 17.7 %
WBC # FLD AUTO: 7.49 K/UL

## 2024-11-20 PROCEDURE — 76376 3D RENDER W/INTRP POSTPROCES: CPT

## 2024-11-20 PROCEDURE — 73700 CT LOWER EXTREMITY W/O DYE: CPT

## 2024-12-03 ENCOUNTER — LABORATORY RESULT (OUTPATIENT)
Age: 79
End: 2024-12-03

## 2024-12-03 ENCOUNTER — APPOINTMENT (OUTPATIENT)
Dept: ORTHOPEDIC SURGERY | Facility: CLINIC | Age: 79
End: 2024-12-03

## 2024-12-03 DIAGNOSIS — T84.84XA PAIN DUE TO INTERNAL ORTHOPEDIC PROSTHETIC DEVICES, IMPLANTS AND GRAFTS, INITIAL ENCOUNTER: ICD-10-CM

## 2024-12-03 DIAGNOSIS — Z96.651 PAIN DUE TO INTERNAL ORTHOPEDIC PROSTHETIC DEVICES, IMPLANTS AND GRAFTS, INITIAL ENCOUNTER: ICD-10-CM

## 2024-12-03 PROCEDURE — 99214 OFFICE O/P EST MOD 30 MIN: CPT | Mod: 25

## 2024-12-03 PROCEDURE — 20610 DRAIN/INJ JOINT/BURSA W/O US: CPT | Mod: RT

## 2024-12-31 DIAGNOSIS — M25.461 EFFUSION, RIGHT KNEE: ICD-10-CM

## 2025-06-02 ENCOUNTER — NON-APPOINTMENT (OUTPATIENT)
Age: 80
End: 2025-06-02

## 2025-06-04 ENCOUNTER — LABORATORY RESULT (OUTPATIENT)
Age: 80
End: 2025-06-04

## 2025-06-04 ENCOUNTER — APPOINTMENT (OUTPATIENT)
Dept: NEUROLOGY | Facility: CLINIC | Age: 80
End: 2025-06-04
Payer: MEDICARE

## 2025-06-04 VITALS
HEART RATE: 81 BPM | HEIGHT: 65 IN | BODY MASS INDEX: 27.49 KG/M2 | SYSTOLIC BLOOD PRESSURE: 115 MMHG | WEIGHT: 165 LBS | DIASTOLIC BLOOD PRESSURE: 80 MMHG

## 2025-06-04 DIAGNOSIS — G31.84 MILD COGNITIVE IMPAIRMENT, SO STATED: ICD-10-CM

## 2025-06-04 DIAGNOSIS — G60.8 OTHER HEREDITARY AND IDIOPATHIC NEUROPATHIES: ICD-10-CM

## 2025-06-04 PROCEDURE — 99204 OFFICE O/P NEW MOD 45 MIN: CPT

## 2025-06-05 ENCOUNTER — NON-APPOINTMENT (OUTPATIENT)
Age: 80
End: 2025-06-05

## 2025-06-05 LAB
ANACR T: NEGATIVE
CELIAC PANEL: NORMAL
CRP SERPL-MCNC: <3 MG/L
ERYTHROCYTE [SEDIMENTATION RATE] IN BLOOD BY WESTERGREN METHOD: 5 MM/HR
FOLATE SERPL-MCNC: 6 NG/ML
HCYS SERPL-MCNC: 14.5 UMOL/L
IGG SERPL-MCNC: 632 MG/DL
IGG1 SER-MCNC: 365 MG/DL
IGG2 SER-MCNC: 203 MG/DL
IGG3 SER-MCNC: 13.1 MG/DL
IGG4 SER-MCNC: 19.4 MG/DL
TSH SERPL-ACNC: 1.86 UIU/ML
VIT B12 SERPL-MCNC: 643 PG/ML

## 2025-06-07 LAB
ALBUMIN MFR SERPL ELPH: 64.8 %
ALBUMIN SERPL-MCNC: 4.1 G/DL
ALBUMIN/GLOB SERPL: 1.8 RATIO
ALPHA1 GLOB MFR SERPL ELPH: 3.9 %
ALPHA1 GLOB SERPL ELPH-MCNC: 0.2 G/DL
ALPHA2 GLOB MFR SERPL ELPH: 10 %
ALPHA2 GLOB SERPL ELPH-MCNC: 0.6 G/DL
B-GLOBULIN MFR SERPL ELPH: 10.9 %
B-GLOBULIN SERPL ELPH-MCNC: 0.7 G/DL
DEPRECATED KAPPA LC FREE/LAMBDA SER: 2.19 RATIO
GAMMA GLOB FLD ELPH-MCNC: 0.7 G/DL
GAMMA GLOB MFR SERPL ELPH: 10.4 %
IGA 24H UR QL IFE: NORMAL
IGA SERPL-MCNC: 120 MG/DL
IGG SERPL-MCNC: 632 MG/DL
IGM SERPL-MCNC: 49 MG/DL
INTERPRETATION SERPL IEP-IMP: NORMAL
KAPPA LC CSF-MCNC: 0.88 MG/DL
KAPPA LC SERPL-MCNC: 1.93 MG/DL
M PROTEIN SPEC IFE-MCNC: NORMAL
PROT SERPL-MCNC: 6.4 G/DL
PROT SERPL-MCNC: 6.4 G/DL
T PALLIDUM AB SER QL IA: NEGATIVE
VIT B1 SERPL-MCNC: 77.8 NMOL/L

## 2025-06-08 LAB — METHYLMALONATE SERPL-SCNC: 164 NMOL/L

## 2025-06-10 ENCOUNTER — APPOINTMENT (OUTPATIENT)
Dept: NEUROLOGY | Facility: CLINIC | Age: 80
End: 2025-06-10

## 2025-06-14 LAB
AMPA-R ABCBA: NEGATIVE
AMPHIPHYSIN IGG TITR SER IF: NEGATIVE
ANNOTATION COMMENT IMP: NORMAL
APOE INTERPRETATION: NORMAL
APOE REASON FOR REFERRAL: NORMAL
APOE RELEASED BY: NORMAL
APOE RESULT SUMMARY: NORMAL
APOE RESULT: NORMAL
APOE SPECIMEN: NORMAL
CASPR2 IGG SER QL CBA IFA: NEGATIVE
CV2 AB SERPL QL IF: NEGATIVE
DPPX IGG SER QL CBA IFA: NEGATIVE
GABA-B ABCBA: NEGATIVE
GAD65 AB SER-MCNC: 0 NMOL/L
GFAP IFA, S: NEGATIVE
GLIAL NUC TYPE 1 AB TITR SER: NEGATIVE
HU1 AB SER QL: NEGATIVE
HU2 AB TITR SER IF: NEGATIVE
HU3 AB TITR SER: NEGATIVE
IMMUNOLOGIST REVIEW: NORMAL
IMMUNOLOGIST REVIEW: NORMAL
LGI1 IGG SER QL CBA IFA: NEGATIVE
LGI1 IGG SER QL CBA IFA: NEGATIVE
MGLUR1 AB IFA, S: NEGATIVE
NEUROCHONDRIN-IFA, SERUM: NEGATIVE
NIF IFA, S: NEGATIVE
NMDA-R ABCBA: NEGATIVE
P-TAU217 SERPL IA-MCNC: 0.15 PG/ML
PCA-1 AB TITR SER: NEGATIVE
PCA-TR AB TITR SER: NEGATIVE

## 2025-07-11 ENCOUNTER — TRANSCRIPTION ENCOUNTER (OUTPATIENT)
Age: 80
End: 2025-07-11

## 2025-07-11 PROBLEM — I42.2 HYPERTROPHIC CARDIOMYOPATHY: Status: ACTIVE | Noted: 2025-07-11

## 2025-07-11 PROBLEM — G56.03 BILATERAL CARPAL TUNNEL SYNDROME: Status: ACTIVE | Noted: 2025-07-11

## 2025-07-23 ENCOUNTER — APPOINTMENT (OUTPATIENT)
Dept: NUCLEAR MEDICINE | Facility: IMAGING CENTER | Age: 80
End: 2025-07-23
Payer: MEDICARE

## 2025-07-23 ENCOUNTER — OUTPATIENT (OUTPATIENT)
Dept: OUTPATIENT SERVICES | Facility: HOSPITAL | Age: 80
LOS: 1 days | End: 2025-07-23
Payer: MEDICARE

## 2025-07-23 DIAGNOSIS — I42.2 OTHER HYPERTROPHIC CARDIOMYOPATHY: ICD-10-CM

## 2025-07-23 DIAGNOSIS — G60.8 OTHER HEREDITARY AND IDIOPATHIC NEUROPATHIES: ICD-10-CM

## 2025-07-23 PROCEDURE — 78830 RP LOCLZJ TUM SPECT W/CT 1: CPT

## 2025-07-23 PROCEDURE — A9538: CPT

## 2025-07-23 PROCEDURE — 78830 RP LOCLZJ TUM SPECT W/CT 1: CPT | Mod: 26

## 2025-07-28 RX ORDER — GABAPENTIN 100 MG/1
100 CAPSULE ORAL
Qty: 540 | Refills: 3 | Status: ACTIVE | COMMUNITY
Start: 2025-07-28 | End: 1900-01-01

## 2025-08-01 DIAGNOSIS — E85.9 AMYLOIDOSIS, UNSPECIFIED: ICD-10-CM

## 2025-08-06 ENCOUNTER — APPOINTMENT (OUTPATIENT)
Dept: ORTHOPEDIC SURGERY | Facility: CLINIC | Age: 80
End: 2025-08-06

## 2025-08-15 ENCOUNTER — APPOINTMENT (OUTPATIENT)
Dept: ORTHOPEDIC SURGERY | Facility: CLINIC | Age: 80
End: 2025-08-15
Payer: MEDICARE

## 2025-08-15 DIAGNOSIS — Z96.651 PAIN DUE TO INTERNAL ORTHOPEDIC PROSTHETIC DEVICES, IMPLANTS AND GRAFTS, INITIAL ENCOUNTER: ICD-10-CM

## 2025-08-15 DIAGNOSIS — T84.84XA PAIN DUE TO INTERNAL ORTHOPEDIC PROSTHETIC DEVICES, IMPLANTS AND GRAFTS, INITIAL ENCOUNTER: ICD-10-CM

## 2025-08-15 DIAGNOSIS — M25.461 EFFUSION, RIGHT KNEE: ICD-10-CM

## 2025-08-15 PROCEDURE — 73562 X-RAY EXAM OF KNEE 3: CPT | Mod: RT

## 2025-08-15 PROCEDURE — 20610 DRAIN/INJ JOINT/BURSA W/O US: CPT | Mod: RT

## 2025-08-18 PROBLEM — E85.9 AMYLOIDOSIS, UNSPECIFIED TYPE: Status: ACTIVE | Noted: 2025-08-18

## 2025-08-25 ENCOUNTER — TRANSCRIPTION ENCOUNTER (OUTPATIENT)
Age: 80
End: 2025-08-25

## 2025-09-08 RX ORDER — GABAPENTIN 100 MG/1
100 CAPSULE ORAL
Qty: 540 | Refills: 3 | Status: ACTIVE | COMMUNITY
Start: 2025-09-08 | End: 1900-01-01